# Patient Record
Sex: FEMALE | Race: WHITE | NOT HISPANIC OR LATINO | Employment: UNEMPLOYED | URBAN - METROPOLITAN AREA
[De-identification: names, ages, dates, MRNs, and addresses within clinical notes are randomized per-mention and may not be internally consistent; named-entity substitution may affect disease eponyms.]

---

## 2019-05-28 PROBLEM — F42.9 OBSESSIVE-COMPULSIVE DISORDER: Status: ACTIVE | Noted: 2018-07-09

## 2019-07-22 PROBLEM — F42.3 HOARDING DISORDER: Chronic | Status: ACTIVE | Noted: 2018-09-07

## 2019-07-22 PROBLEM — F42.3 HOARDING DISORDER: Status: ACTIVE | Noted: 2018-09-07

## 2019-07-22 PROBLEM — F42.9 OBSESSIVE-COMPULSIVE DISORDER: Chronic | Status: ACTIVE | Noted: 2018-07-09

## 2019-08-26 LAB
ALBUMIN: 3.4 G/DL (ref 3.4–5)
ALP ISOS SERPL LEV INH-CCNC: 90 U/L (ref 45–117)
ALT (SGPT): 19 U/L (ref 13–56)
ANION GAP SERPL CALC-SCNC: 10 MMOL/L (ref 4–14)
AST SERPL-CCNC: 21 U/L (ref 15–37)
BASOPHILS - REL (DIFF): 0.8 %
BASOPHILS NFR BLD: 0.03 K/UL (ref 0–0.1)
BILIRUB SERPL-MCNC: 0.6 MG/DL (ref 0.2–1)
BUN SERPL-MCNC: 6 MG/DL (ref 7–18)
BUN/CREAT RATIO: 6.8
CALCIUM SERPL-MCNC: 8.4 MG/DL (ref 8.5–10.1)
CHLORIDE SERPL-SCNC: 106 MMOL/L (ref 98–107)
CO2 SERPL-SCNC: 26 MMOL/L (ref 21–32)
CREAT SERPL-MCNC: 0.88 MG/DL (ref 0.6–1.3)
EOSINOPHILS - ABS (DIFF): 0.07 K/UL (ref 0–0.5)
EOSINOPHILS - REL (DIFF): 1.8 %
ERYTHROCYTE [DISTWIDTH] IN BLOOD BY AUTOMATED COUNT: 13.4 % (ref 12.3–17)
GFR MDRD AF AMER: >60 ML/MIN
GFR MDRD NON AF AMER: >60 ML/MIN
GLUCOSE: 158 MG/DL (ref 74–106)
HCT VFR BLD AUTO: 40.8 % (ref 32–45.4)
HGB BLD-MCNC: 13.5 G/DL (ref 10.9–14.3)
IMM GRAN %: 0.5 % (ref 0–0.9)
IMMATURE GRANS (ABS): 0.02 K/UL (ref 0–0.1)
LYMPH #: 1.36 K/UL (ref 1–3)
LYMPH%: 34.3 %
MCH RBC QN AUTO: 30.3 PG (ref 24.3–33.2)
MCHC RBC AUTO-ENTMCNC: 33.1 G/DL (ref 29.6–33.8)
MCV RBC AUTO: 91.5 FL (ref 79.3–93.5)
MONO #: 0.33 K/UL (ref 0.3–1)
MONO%: 8.3 %
NEU %: 54.3 %
NEUTROPHILS ABSOLUTE: 2.15 K/UL (ref 1.8–7.8)
NUCLEATED RBC %: 0 % (ref 0–0.48)
NUCLEATED RBC ABSOLUTE: 0 K/UL
PLATELET COUNT: 217 K/UL (ref 159–386)
PMV BLD AUTO: 9.6 FL (ref 9.1–12.7)
POTASSIUM: 3.4 MMOL/L (ref 3.5–5.1)
RBC # BLD AUTO: 4.46 M/UL (ref 3.63–4.92)
RDW-SD: 44.9 FL (ref 37.5–49)
SODIUM: 142 MMOL/L (ref 136–145)
TOTAL PROTEIN: 7 G/DL (ref 6.4–8.2)
WBC # BLD AUTO: 3.96 K/UL (ref 3.6–11.2)

## 2019-11-20 PROBLEM — J30.89 NON-SEASONAL ALLERGIC RHINITIS: Status: ACTIVE | Noted: 2019-11-20

## 2019-11-20 PROBLEM — J32.9 SINUSITIS: Status: ACTIVE | Noted: 2019-11-20

## 2020-01-22 ENCOUNTER — TELEPHONE (OUTPATIENT)
Dept: PSYCHIATRY | Facility: CLINIC | Age: 50
End: 2020-01-22

## 2020-01-22 NOTE — TELEPHONE ENCOUNTER
----- Message from Regina Tom sent at 1/22/2020  8:04 AM CST -----  Contact: Self  Pt is calling to speak with Staff regarding rescheduling her appt for today.    She can be reached at [phone].    Thank you.

## 2020-05-14 PROBLEM — F42.3 HOARDING DISORDER: Chronic | Status: RESOLVED | Noted: 2018-09-07 | Resolved: 2020-05-14

## 2020-07-08 PROBLEM — Z01.419 ENCOUNTER FOR GYNECOLOGICAL EXAMINATION WITH PAPANICOLAOU SMEAR OF CERVIX: Status: ACTIVE | Noted: 2020-07-08

## 2020-07-08 PROBLEM — N93.9 ABNORMAL UTERINE BLEEDING (AUB): Status: ACTIVE | Noted: 2020-07-08

## 2020-12-09 PROBLEM — I49.9 IRREGULAR HEART BEAT: Status: ACTIVE | Noted: 2020-12-09

## 2021-02-01 PROBLEM — I82.409 RECURRENT DEEP VEIN THROMBOSIS (DVT): Status: ACTIVE | Noted: 2021-02-01

## 2021-02-01 PROBLEM — Z79.01 CURRENT USE OF ANTICOAGULANT THERAPY: Status: ACTIVE | Noted: 2021-02-01

## 2021-05-07 PROBLEM — Z91.09 HOUSE DUST MITE ALLERGY: Status: ACTIVE | Noted: 2021-05-07

## 2021-05-07 PROBLEM — H10.403 CHRONIC CONJUNCTIVITIS OF BOTH EYES: Status: ACTIVE | Noted: 2021-05-07

## 2021-05-07 PROBLEM — J30.9 ALLERGIC RHINITIS: Status: ACTIVE | Noted: 2021-05-07

## 2021-05-07 PROBLEM — J30.1 ALLERGIC RHINITIS DUE TO POLLEN: Status: ACTIVE | Noted: 2021-05-07

## 2021-05-12 ENCOUNTER — PATIENT MESSAGE (OUTPATIENT)
Dept: RESEARCH | Facility: HOSPITAL | Age: 51
End: 2021-05-12

## 2021-06-01 PROBLEM — D49.0 GASTRIC TUMOR: Status: ACTIVE | Noted: 2021-06-01

## 2021-07-20 PROBLEM — R62.7 FAILURE TO THRIVE IN ADULT: Status: ACTIVE | Noted: 2021-07-20

## 2021-07-23 PROBLEM — R62.7 FAILURE TO THRIVE IN ADULT: Status: RESOLVED | Noted: 2021-07-20 | Resolved: 2021-07-23

## 2021-08-30 PROBLEM — F43.10 PTSD (POST-TRAUMATIC STRESS DISORDER): Status: ACTIVE | Noted: 2021-08-30

## 2021-09-08 PROBLEM — C7A.8 NEUROENDOCRINE CARCINOMA OF STOMACH: Status: ACTIVE | Noted: 2021-09-08

## 2021-10-06 PROBLEM — Z90.3 STATUS POST GASTRECTOMY: Status: ACTIVE | Noted: 2021-10-06

## 2021-10-19 PROBLEM — H93.19 TINNITUS: Status: ACTIVE | Noted: 2021-10-19

## 2021-12-06 PROBLEM — R00.2 PALPITATIONS: Chronic | Status: ACTIVE | Noted: 2021-12-06

## 2022-03-14 PROBLEM — B20 HUMAN IMMUNODEFICIENCY VIRUS (HIV) DISEASE: Status: ACTIVE | Noted: 2022-03-14

## 2022-04-19 PROBLEM — R10.2 PELVIC PAIN: Status: ACTIVE | Noted: 2022-04-19

## 2022-04-19 PROBLEM — N39.0 RECURRENT UTI: Status: ACTIVE | Noted: 2022-04-19

## 2022-04-19 PROBLEM — R35.1 NOCTURIA: Status: ACTIVE | Noted: 2022-04-19

## 2022-08-02 PROBLEM — Z86.69 HISTORY OF SLEEP APNEA: Status: ACTIVE | Noted: 2022-08-02

## 2022-08-19 PROBLEM — R00.2 PALPITATIONS: Status: ACTIVE | Noted: 2021-12-06

## 2022-10-18 ENCOUNTER — PATIENT MESSAGE (OUTPATIENT)
Dept: RESEARCH | Facility: HOSPITAL | Age: 52
End: 2022-10-18
Payer: MEDICAID

## 2022-11-07 ENCOUNTER — RESEARCH ENCOUNTER (OUTPATIENT)
Dept: RESEARCH | Facility: HOSPITAL | Age: 52
End: 2022-11-07
Payer: MEDICAID

## 2022-11-07 NOTE — PROGRESS NOTES
Study Title: Transcending COVID-19 barriers to pain care in [place]: Pragmatic comparative effectiveness trial of evidence-based, on-demand, digital behavioral treatments for chronic pain.  Sponsor:  [facility]   Study: [facility] Digital Pain Treatment Study - Transcending COVID-19 barriers to pain care in [place]: Pragmatic comparative effectiveness trial of evidence-based, on-demand, digital behavioral treatments for chronic pain.   IRB/Protocol #: 2021.177 - Phase 2?  Study#([facility]):  [id number]  Principle Investigator - Mihai Reyes   Sub-Investigator - Jairo Yousif   Subject [facility] Study Number: [id number]  Subject [facility] Study Number:310   Sponsor:  [facility]      Date: 10/22/2022       Informed Consent    Subject was consented via phone to be evaluated for the [employer] Digital Pain Treatment Research Study eligibility. Consent was completed using the most current IRB approved version of the ICF dated 11-AUG-2022 by myself and patient was given ample time to review consent prior to execution of the informed consent.         Prior to the Informed Consent (IC) being signed, or any study protocol required data collection, testing, procedure, or intervention being performed, the following was done and/or discussed:    Purpose of the study and qualifications to participate;   Study design, follow up schedule;  Risk, Benefits, Alternative Treatments, Compensation and Costs;  Participation in the research trial is voluntary and patient may withdraw at anytime;  Contact information for study related questions.    Patient verbalized understanding of the above: yes  Contact information for CRC and PI given to patient: yes    Pt verbally agreed to being a participant in the [employer] Digital Pain Treatment research study with an IRB approved consent being read to the participant.  Patient consent was reviewed with patient and all questions answered satisfactorily.      Patient answered questions related to their pain and  lifestyle questions.Subject meets all inclusion criteria and no exclusion criteria.    Subject was provide with staff contact information should they have any questions and concerns.

## 2022-11-09 PROBLEM — D3A.8 NEUROENDOCRINE NEOPLASM OF STOMACH: Status: ACTIVE | Noted: 2021-06-01

## 2022-11-09 PROBLEM — D3A.8 NEUROENDOCRINE NEOPLASM OF STOMACH: Status: ACTIVE | Noted: 2021-09-08

## 2023-01-09 PROBLEM — I49.3 PVC (PREMATURE VENTRICULAR CONTRACTION): Status: ACTIVE | Noted: 2023-01-09

## 2023-01-14 PROBLEM — G31.84 MCI (MILD COGNITIVE IMPAIRMENT) WITH MEMORY LOSS: Status: ACTIVE | Noted: 2023-01-14

## 2023-01-14 PROBLEM — G47.01 INSOMNIA DUE TO MEDICAL CONDITION: Status: ACTIVE | Noted: 2023-01-14

## 2023-01-14 PROBLEM — G43.009 MIGRAINE WITHOUT AURA AND WITHOUT STATUS MIGRAINOSUS, NOT INTRACTABLE: Status: ACTIVE | Noted: 2023-01-14

## 2023-02-02 PROBLEM — H25.812 COMBINED FORMS OF AGE-RELATED CATARACT OF LEFT EYE: Status: ACTIVE | Noted: 2023-02-02

## 2023-02-03 PROBLEM — Z98.890 S/P EYE SURGERY: Status: ACTIVE | Noted: 2021-10-06

## 2023-02-08 PROBLEM — N95.0 POSTMENOPAUSAL BLEEDING: Status: ACTIVE | Noted: 2020-07-08

## 2023-02-16 PROBLEM — H25.811 COMBINED FORMS OF AGE-RELATED CATARACT OF RIGHT EYE: Status: ACTIVE | Noted: 2023-02-16

## 2023-04-18 PROBLEM — G90.9 AUTONOMIC DYSFUNCTION: Status: ACTIVE | Noted: 2023-04-18

## 2023-04-18 PROBLEM — I95.1 ORTHOSTATIC HYPOTENSION: Status: ACTIVE | Noted: 2023-04-18

## 2023-06-15 PROBLEM — F33.1 MDD (MAJOR DEPRESSIVE DISORDER), RECURRENT EPISODE, MODERATE: Status: ACTIVE | Noted: 2023-06-15

## 2023-09-19 ENCOUNTER — SOCIAL WORK (OUTPATIENT)
Dept: ADMINISTRATIVE | Facility: OTHER | Age: 53
End: 2023-09-19
Payer: MEDICAID

## 2023-09-19 NOTE — PROGRESS NOTES
The requested chart notes from 8/23/2023  has been submitted to [facility] @ [phone]    [facility]  [phone]

## 2023-11-02 PROBLEM — F41.9 ANXIETY: Status: ACTIVE | Noted: 2023-11-02

## 2023-11-03 ENCOUNTER — SOCIAL WORK (OUTPATIENT)
Dept: ADMINISTRATIVE | Facility: OTHER | Age: 53
End: 2023-11-03
Payer: MEDICAID

## 2023-11-03 NOTE — PROGRESS NOTES
Sw received a consult to assist with counseling. Sw called and spoke to Patient ([phone]). She declined counseling services at this time. Sw encouraged her to contact the Clinic if she changes her mind. She verbalized understanding.    Nithya Hernandez LCSW    [phone]

## 2023-11-07 PROBLEM — N32.81 OAB (OVERACTIVE BLADDER): Status: ACTIVE | Noted: 2023-11-07

## 2023-11-07 PROBLEM — N95.2 ATROPHIC VAGINITIS: Status: ACTIVE | Noted: 2023-11-07

## 2023-11-16 PROBLEM — D50.8 IRON DEFICIENCY ANEMIA AFTER GASTRECTOMY: Status: ACTIVE | Noted: 2023-11-16

## 2023-11-16 PROBLEM — K91.89 IRON DEFICIENCY ANEMIA AFTER GASTRECTOMY: Status: ACTIVE | Noted: 2023-11-16
